# Patient Record
Sex: MALE | Race: WHITE | NOT HISPANIC OR LATINO | Employment: FULL TIME | URBAN - METROPOLITAN AREA
[De-identification: names, ages, dates, MRNs, and addresses within clinical notes are randomized per-mention and may not be internally consistent; named-entity substitution may affect disease eponyms.]

---

## 2018-10-28 ENCOUNTER — HOSPITAL ENCOUNTER (EMERGENCY)
Facility: HOSPITAL | Age: 28
Discharge: HOME | End: 2018-10-28
Attending: EMERGENCY MEDICINE
Payer: COMMERCIAL

## 2018-10-28 VITALS
RESPIRATION RATE: 17 BRPM | HEIGHT: 70 IN | SYSTOLIC BLOOD PRESSURE: 145 MMHG | WEIGHT: 185 LBS | BODY MASS INDEX: 26.48 KG/M2 | DIASTOLIC BLOOD PRESSURE: 74 MMHG | TEMPERATURE: 99 F | HEART RATE: 103 BPM

## 2018-10-28 DIAGNOSIS — S93.402A SPRAIN OF LEFT ANKLE, UNSPECIFIED LIGAMENT, INITIAL ENCOUNTER: Primary | ICD-10-CM

## 2018-10-28 DIAGNOSIS — R52 PAIN: ICD-10-CM

## 2018-10-28 PROCEDURE — 99284 EMERGENCY DEPT VISIT MOD MDM: CPT | Mod: 25

## 2018-10-28 PROCEDURE — 96372 THER/PROPH/DIAG INJ SC/IM: CPT

## 2018-10-28 PROCEDURE — 63600175 PHARM REV CODE 636 W HCPCS: Performed by: PHYSICIAN ASSISTANT

## 2018-10-28 PROCEDURE — 29515 APPLICATION SHORT LEG SPLINT: CPT

## 2018-10-28 RX ORDER — KETOROLAC TROMETHAMINE 30 MG/ML
30 INJECTION, SOLUTION INTRAMUSCULAR; INTRAVENOUS
Status: COMPLETED | OUTPATIENT
Start: 2018-10-28 | End: 2018-10-28

## 2018-10-28 RX ADMIN — KETOROLAC TROMETHAMINE 30 MG: 30 INJECTION, SOLUTION INTRAMUSCULAR at 07:10

## 2018-10-29 NOTE — ED NOTES
Patient identifiers for Coy Emmanuel Jr. checked and correct.  LOC:  Patient is awake, alert, and aware of environment with an appropriate affect. Patient is oriented x 3 and speaking appropriately.  APPEARANCE:  Patient resting comfortably and in no acute distress. Patient is clean and well groomed, patient's clothing is properly fastened.  SKIN:  The skin is warm and dry. Patient has normal skin turgor and moist mucus membranes. Skin is intact; no bruising or breakdown noted.  MUSCULOSKELETAL:  Patient is moving all extremities well, no obvious deformities noted. Pulses intact.  Lt ankle pain and mod swelling.  Pt twisted ankle last night.  Ambulates with a limp.  Pedal pulses equal, present and strong   RESPIRATORY:  Airway is open and patent. Respirations are spontaneous and non-labored with normal effort and rate.  CARDIAC:  Patient has a normal rate and rhythm. No peripheral edema noted. Capillary refill < 3 seconds.  ABDOMEN:  No distention noted.  Soft and non-tender upon palpation.  NEUROLOGICAL:  PERRL. Facial expression is symmetrical. Hand grasps are equal bilaterally. Normal sensation in all extremities when touched with finger.  Allergies reported:  Review of patient's allergies indicates:  No Known Allergies  OTHER NOTES:

## 2018-10-29 NOTE — ED PROVIDER NOTES
Encounter Date: 10/28/2018    SCRIBE #1 NOTE: IMarilyn, am scribing for, and in the presence of, AMBER Ha.       History     Chief Complaint   Patient presents with    Ankle Pain     Twisted ankle last night.  Pain and swelling noted        Time seen by provider: 7:33 PM on 10/28/2018    Coy Emmanuel Jr. is a 28 y.o. male with no pertinent PMHx or SHx on file who presents to the ED accompanied by his mother with complaints of left ankle pain associated with swelling that started last night. Patient states he was stepping off of a step ladder when his leg gave out causing him to twist his left ankle inadvertently. He endorses applying ice to his ankle and wrapping it while elevating his foot with no improvements to pain or swelling. He also reports taking Aleve with little to no improvements to pain. Patient endorses being able to bear weight on his foot but still has pain. He denies having any chronic health conditions. He denies onset of any other new symptoms. He has no other medical concerns or complaints at this moment. NKDA noted.       The history is provided by the patient.     Review of patient's allergies indicates:  No Known Allergies  History reviewed. No pertinent past medical history.  History reviewed. No pertinent surgical history.  History reviewed. No pertinent family history.  Social History     Tobacco Use    Smoking status: Current Every Day Smoker     Packs/day: 0.50    Smokeless tobacco: Never Used   Substance Use Topics    Alcohol use: Yes    Drug use: No     Review of Systems   Constitutional: Negative for fever.   HENT: Negative for facial swelling.    Respiratory: Negative for shortness of breath.    Cardiovascular: Negative for leg swelling.   Gastrointestinal: Negative for nausea and vomiting.   Musculoskeletal: Positive for arthralgias (left ankle) and joint swelling (left ankle). Negative for gait problem and neck pain.   Skin: Negative for color change,  rash and wound.   Neurological: Negative for weakness and numbness.   Hematological: Does not bruise/bleed easily.   Psychiatric/Behavioral: Negative for confusion.       Physical Exam     Initial Vitals [10/28/18 1905]   BP Pulse Resp Temp SpO2   (!) 145/74 103 17 98.6 °F (37 °C) --      MAP       --         Physical Exam    Nursing note and vitals reviewed.  Constitutional: He appears well-developed and well-nourished. He is not diaphoretic. No distress.   Cardiovascular: Normal rate, regular rhythm, normal heart sounds and intact distal pulses. Exam reveals no gallop and no friction rub.    No murmur heard.  Pulses:       Dorsalis pedis pulses are 2+ on the right side, and 2+ on the left side.        Posterior tibial pulses are 2+ on the right side, and 2+ on the left side.   Pulmonary/Chest: Breath sounds normal. No respiratory distress. He has no wheezes. He has no rhonchi. He has no rales.   Musculoskeletal: Normal range of motion. He exhibits tenderness. He exhibits no edema.        Left ankle: He exhibits swelling. He exhibits normal range of motion, no deformity and normal pulse. Tenderness.   Swelling noted to the left ankle. No obvious deformity. TTP with no bruising to the left ankle. Capillary refills less than 2 seconds. Normal DP/PT pulses. Pt able to bear weight.    Neurological: He is alert and oriented to person, place, and time. He has normal strength. No sensory deficit.   Skin: Skin is warm and dry. Capillary refill takes less than 2 seconds. No bruising, no rash and no abscess noted. No erythema.   Psychiatric: He has a normal mood and affect.         ED Course   Procedures  Labs Reviewed - No data to display       Imaging Results          X-Ray Ankle Complete Left (In process)                  Medical Decision Making:   Differential Diagnosis:   Fracture  Dislocation  Sprain  Contusion  Strain  Spasm    Clinical Tests:   Radiological Study: Ordered and Reviewed       APC / Resident Notes:    Xray negative for acute abnormalities.  Pt given toradol injection and placed in ankle stirrup in ED. Pt instructed on RICE care.  Instructed pt to f/u with ortho if no improvement. Pt voices understanding and is agreeable to the plan.  He is given specific return precautions.        Scribe Attestation:   Scribe #1: I performed the above scribed service and the documentation accurately describes the services I performed. I attest to the accuracy of the note.      I, AMBER Ha, personally performed the services described in this documentation. All medical record entries made by the scribe were at my direction and in my presence.  I have reviewed the chart and agree that the record reflects my personal performance and is accurate and complete. AMBER Ha.  9:13 PM 10/28/2018           Clinical Impression:   The primary encounter diagnosis was Sprain of left ankle, unspecified ligament, initial encounter. A diagnosis of Pain was also pertinent to this visit.                             Danielle Quinones NP  10/28/18 [id number]

## 2021-07-01 ENCOUNTER — PATIENT MESSAGE (OUTPATIENT)
Dept: ADMINISTRATIVE | Facility: OTHER | Age: 31
End: 2021-07-01

## 2021-08-03 ENCOUNTER — OFFICE VISIT (OUTPATIENT)
Dept: FAMILY MEDICINE | Facility: CLINIC | Age: 31
End: 2021-08-03
Payer: COMMERCIAL

## 2021-08-03 VITALS
WEIGHT: 199 LBS | DIASTOLIC BLOOD PRESSURE: 90 MMHG | BODY MASS INDEX: 28.49 KG/M2 | SYSTOLIC BLOOD PRESSURE: 146 MMHG | HEART RATE: 76 BPM | HEIGHT: 70 IN

## 2021-08-03 DIAGNOSIS — Z02.5 ROUTINE SPORTS PHYSICAL EXAM: Primary | ICD-10-CM

## 2021-08-03 DIAGNOSIS — I10 ESSENTIAL HYPERTENSION: ICD-10-CM

## 2021-08-03 PROCEDURE — 1160F RVW MEDS BY RX/DR IN RCRD: CPT | Mod: S$GLB,,, | Performed by: PHYSICIAN ASSISTANT

## 2021-08-03 PROCEDURE — 3077F SYST BP >= 140 MM HG: CPT | Mod: S$GLB,,, | Performed by: PHYSICIAN ASSISTANT

## 2021-08-03 PROCEDURE — 3077F PR MOST RECENT SYSTOLIC BLOOD PRESSURE >= 140 MM HG: ICD-10-PCS | Mod: S$GLB,,, | Performed by: PHYSICIAN ASSISTANT

## 2021-08-03 PROCEDURE — 1160F PR REVIEW ALL MEDS BY PRESCRIBER/CLIN PHARMACIST DOCUMENTED: ICD-10-PCS | Mod: S$GLB,,, | Performed by: PHYSICIAN ASSISTANT

## 2021-08-03 PROCEDURE — 3008F PR BODY MASS INDEX (BMI) DOCUMENTED: ICD-10-PCS | Mod: S$GLB,,, | Performed by: PHYSICIAN ASSISTANT

## 2021-08-03 PROCEDURE — 3008F BODY MASS INDEX DOCD: CPT | Mod: S$GLB,,, | Performed by: PHYSICIAN ASSISTANT

## 2021-08-03 PROCEDURE — 99203 OFFICE O/P NEW LOW 30 MIN: CPT | Mod: S$GLB,,, | Performed by: PHYSICIAN ASSISTANT

## 2021-08-03 PROCEDURE — 3080F DIAST BP >= 90 MM HG: CPT | Mod: S$GLB,,, | Performed by: PHYSICIAN ASSISTANT

## 2021-08-03 PROCEDURE — 3080F PR MOST RECENT DIASTOLIC BLOOD PRESSURE >= 90 MM HG: ICD-10-PCS | Mod: S$GLB,,, | Performed by: PHYSICIAN ASSISTANT

## 2021-08-03 PROCEDURE — 99203 PR OFFICE/OUTPT VISIT, NEW, LEVL III, 30-44 MIN: ICD-10-PCS | Mod: S$GLB,,, | Performed by: PHYSICIAN ASSISTANT

## 2021-08-03 RX ORDER — LISINOPRIL 10 MG/1
10 TABLET ORAL DAILY
Qty: 90 TABLET | Refills: 3 | Status: SHIPPED | OUTPATIENT
Start: 2021-08-03 | End: 2022-02-08 | Stop reason: SDUPTHER

## 2021-08-05 ENCOUNTER — TELEPHONE (OUTPATIENT)
Dept: FAMILY MEDICINE | Facility: CLINIC | Age: 31
End: 2021-08-05

## 2021-08-05 LAB
ALBUMIN SERPL-MCNC: 5.2 G/DL (ref 3.6–5.1)
ALBUMIN/CREAT UR: 3 MCG/MG CREAT
ALBUMIN/GLOB SERPL: 2.2 (CALC) (ref 1–2.5)
ALP SERPL-CCNC: 79 U/L (ref 36–130)
ALT SERPL-CCNC: 65 U/L (ref 9–46)
APPEARANCE UR: CLEAR
AST SERPL-CCNC: 29 U/L (ref 10–40)
BACTERIA #/AREA URNS HPF: NORMAL /HPF
BACTERIA UR CULT: NORMAL
BASOPHILS # BLD AUTO: 61 CELLS/UL (ref 0–200)
BASOPHILS NFR BLD AUTO: 1 %
BILIRUB SERPL-MCNC: 0.8 MG/DL (ref 0.2–1.2)
BILIRUB UR QL STRIP: NEGATIVE
BUN SERPL-MCNC: 12 MG/DL (ref 7–25)
BUN/CREAT SERPL: ABNORMAL (CALC) (ref 6–22)
CALCIUM SERPL-MCNC: 10.1 MG/DL (ref 8.6–10.3)
CHLORIDE SERPL-SCNC: 102 MMOL/L (ref 98–110)
CHOLEST SERPL-MCNC: 205 MG/DL
CHOLEST/HDLC SERPL: 5.4 (CALC)
CO2 SERPL-SCNC: 31 MMOL/L (ref 20–32)
COLOR UR: YELLOW
CREAT SERPL-MCNC: 1.13 MG/DL (ref 0.6–1.35)
CREAT UR-MCNC: 260 MG/DL (ref 20–320)
EOSINOPHIL # BLD AUTO: 470 CELLS/UL (ref 15–500)
EOSINOPHIL NFR BLD AUTO: 7.7 %
ERYTHROCYTE [DISTWIDTH] IN BLOOD BY AUTOMATED COUNT: 12.5 % (ref 11–15)
GLOBULIN SER CALC-MCNC: 2.4 G/DL (CALC) (ref 1.9–3.7)
GLUCOSE SERPL-MCNC: 85 MG/DL (ref 65–99)
GLUCOSE UR QL STRIP: NEGATIVE
HCT VFR BLD AUTO: 47.1 % (ref 38.5–50)
HCV AB S/CO SERPL IA: 0.01
HCV AB SERPL QL IA: NORMAL
HDLC SERPL-MCNC: 38 MG/DL
HGB BLD-MCNC: 16.4 G/DL (ref 13.2–17.1)
HGB UR QL STRIP: NEGATIVE
HIV 1+2 AB+HIV1 P24 AG SERPL QL IA: NORMAL
HYALINE CASTS #/AREA URNS LPF: NORMAL /LPF
KETONES UR QL STRIP: NEGATIVE
LDLC SERPL CALC-MCNC: 124 MG/DL (CALC)
LEUKOCYTE ESTERASE UR QL STRIP: NEGATIVE
LYMPHOCYTES # BLD AUTO: 1147 CELLS/UL (ref 850–3900)
LYMPHOCYTES NFR BLD AUTO: 18.8 %
MCH RBC QN AUTO: 31.8 PG (ref 27–33)
MCHC RBC AUTO-ENTMCNC: 34.8 G/DL (ref 32–36)
MCV RBC AUTO: 91.5 FL (ref 80–100)
MICROALBUMIN UR-MCNC: 0.9 MG/DL
MONOCYTES # BLD AUTO: 403 CELLS/UL (ref 200–950)
MONOCYTES NFR BLD AUTO: 6.6 %
NEUTROPHILS # BLD AUTO: 4020 CELLS/UL (ref 1500–7800)
NEUTROPHILS NFR BLD AUTO: 65.9 %
NITRITE UR QL STRIP: NEGATIVE
NONHDLC SERPL-MCNC: 167 MG/DL (CALC)
PH UR STRIP: 6 [PH] (ref 5–8)
PLATELET # BLD AUTO: 251 THOUSAND/UL (ref 140–400)
PMV BLD REES-ECKER: 10.4 FL (ref 7.5–12.5)
POTASSIUM SERPL-SCNC: 4 MMOL/L (ref 3.5–5.3)
PROT SERPL-MCNC: 7.6 G/DL (ref 6.1–8.1)
PROT UR QL STRIP: NEGATIVE
RBC # BLD AUTO: 5.15 MILLION/UL (ref 4.2–5.8)
RBC #/AREA URNS HPF: NORMAL /HPF
SODIUM SERPL-SCNC: 140 MMOL/L (ref 135–146)
SP GR UR STRIP: 1.03 (ref 1–1.03)
SQUAMOUS #/AREA URNS HPF: NORMAL /HPF
TRIGL SERPL-MCNC: 300 MG/DL
TSH SERPL-ACNC: 3.3 MIU/L (ref 0.4–4.5)
WBC # BLD AUTO: 6.1 THOUSAND/UL (ref 3.8–10.8)
WBC #/AREA URNS HPF: NORMAL /HPF

## 2021-08-23 ENCOUNTER — HOSPITAL ENCOUNTER (OUTPATIENT)
Dept: RADIOLOGY | Facility: HOSPITAL | Age: 31
Discharge: HOME | End: 2021-08-23
Attending: PHYSICIAN ASSISTANT
Payer: COMMERCIAL

## 2021-08-23 ENCOUNTER — TELEPHONE (OUTPATIENT)
Dept: FAMILY MEDICINE | Facility: CLINIC | Age: 31
End: 2021-08-23

## 2021-08-23 DIAGNOSIS — S62.609A FINGER FRACTURE: Primary | ICD-10-CM

## 2021-08-23 DIAGNOSIS — M79.641 HAND PAIN, RIGHT: Primary | ICD-10-CM

## 2021-08-23 DIAGNOSIS — M79.641 HAND PAIN, RIGHT: ICD-10-CM

## 2021-08-23 PROCEDURE — 73130 X-RAY EXAM OF HAND: CPT | Mod: TC,PO,RT

## 2021-08-24 ENCOUNTER — TELEPHONE (OUTPATIENT)
Dept: FAMILY MEDICINE | Facility: CLINIC | Age: 31
End: 2021-08-24

## 2021-09-14 ENCOUNTER — PATIENT MESSAGE (OUTPATIENT)
Dept: FAMILY MEDICINE | Facility: CLINIC | Age: 31
End: 2021-09-14

## 2021-12-06 ENCOUNTER — PATIENT MESSAGE (OUTPATIENT)
Dept: FAMILY MEDICINE | Facility: CLINIC | Age: 31
End: 2021-12-06

## 2022-02-08 ENCOUNTER — OFFICE VISIT (OUTPATIENT)
Dept: FAMILY MEDICINE | Facility: CLINIC | Age: 32
End: 2022-02-08
Payer: COMMERCIAL

## 2022-02-08 VITALS
OXYGEN SATURATION: 98 % | SYSTOLIC BLOOD PRESSURE: 128 MMHG | BODY MASS INDEX: 28.92 KG/M2 | WEIGHT: 202 LBS | DIASTOLIC BLOOD PRESSURE: 82 MMHG | HEART RATE: 76 BPM | HEIGHT: 70 IN

## 2022-02-08 DIAGNOSIS — I10 ESSENTIAL HYPERTENSION: ICD-10-CM

## 2022-02-08 DIAGNOSIS — Z02.5 ROUTINE SPORTS PHYSICAL EXAM: Primary | ICD-10-CM

## 2022-02-08 DIAGNOSIS — Z23 FLU VACCINE NEED: ICD-10-CM

## 2022-02-08 PROCEDURE — 90471 IMMUNIZATION ADMIN: CPT | Mod: S$GLB,,, | Performed by: PHYSICIAN ASSISTANT

## 2022-02-08 PROCEDURE — 3074F PR MOST RECENT SYSTOLIC BLOOD PRESSURE < 130 MM HG: ICD-10-PCS | Mod: S$GLB,,, | Performed by: PHYSICIAN ASSISTANT

## 2022-02-08 PROCEDURE — 99395 PR PREVENTIVE VISIT,EST,18-39: ICD-10-PCS | Mod: 25,S$GLB,, | Performed by: PHYSICIAN ASSISTANT

## 2022-02-08 PROCEDURE — 90682 FLU VACCINE - QUADRIVALENT (RECOMBINANT) PRESERVATIVE FREE: ICD-10-PCS | Mod: S$GLB,,, | Performed by: PHYSICIAN ASSISTANT

## 2022-02-08 PROCEDURE — 90682 RIV4 VACC RECOMBINANT DNA IM: CPT | Mod: S$GLB,,, | Performed by: PHYSICIAN ASSISTANT

## 2022-02-08 PROCEDURE — 3008F BODY MASS INDEX DOCD: CPT | Mod: S$GLB,,, | Performed by: PHYSICIAN ASSISTANT

## 2022-02-08 PROCEDURE — 3074F SYST BP LT 130 MM HG: CPT | Mod: S$GLB,,, | Performed by: PHYSICIAN ASSISTANT

## 2022-02-08 PROCEDURE — 3079F DIAST BP 80-89 MM HG: CPT | Mod: S$GLB,,, | Performed by: PHYSICIAN ASSISTANT

## 2022-02-08 PROCEDURE — 3079F PR MOST RECENT DIASTOLIC BLOOD PRESSURE 80-89 MM HG: ICD-10-PCS | Mod: S$GLB,,, | Performed by: PHYSICIAN ASSISTANT

## 2022-02-08 PROCEDURE — 3008F PR BODY MASS INDEX (BMI) DOCUMENTED: ICD-10-PCS | Mod: S$GLB,,, | Performed by: PHYSICIAN ASSISTANT

## 2022-02-08 PROCEDURE — 99395 PREV VISIT EST AGE 18-39: CPT | Mod: 25,S$GLB,, | Performed by: PHYSICIAN ASSISTANT

## 2022-02-08 PROCEDURE — 90471 FLU VACCINE - QUADRIVALENT (RECOMBINANT) PRESERVATIVE FREE: ICD-10-PCS | Mod: S$GLB,,, | Performed by: PHYSICIAN ASSISTANT

## 2022-02-08 RX ORDER — LISINOPRIL 10 MG/1
10 TABLET ORAL DAILY
Qty: 90 TABLET | Refills: 3 | Status: SHIPPED | OUTPATIENT
Start: 2022-02-08 | End: 2023-01-19 | Stop reason: SDUPTHER

## 2022-02-08 NOTE — PROGRESS NOTES
SUBJECTIVE:    Patient ID: Coy Emmanuel Jr. is a 31 y.o. male.    Chief Complaint: Follow-up (6 mo f/u for hypertension//no med bottles//flu vac ordered//tc)    30 yo wm presents for regular checkup and refills. Reports that he has been doing well. Had RT pinky finger fracture that actually healed well without requiring surgery. Reports that his pressure has been looking great. Compliant with lisinopril as prescribed. Pressure readings at home at goal as well. Tolerating just fine. Stays active.      No visits with results within 6 Month(s) from this visit.   Latest known visit with results is:   Office Visit on 08/03/2021   Component Date Value Ref Range Status    WBC 08/03/2021 6.1  3.8 - 10.8 Thousand/uL Final    RBC 08/03/2021 5.15  4.20 - 5.80 Million/uL Final    Hemoglobin 08/03/2021 16.4  13.2 - 17.1 g/dL Final    Hematocrit 08/03/2021 47.1  38.5 - 50.0 % Final    MCV 08/03/2021 91.5  80.0 - 100.0 fL Final    MCH 08/03/2021 31.8  27.0 - 33.0 pg Final    MCHC 08/03/2021 34.8  32.0 - 36.0 g/dL Final    RDW 08/03/2021 12.5  11.0 - 15.0 % Final    Platelets 08/03/2021 251  140 - 400 Thousand/uL Final    MPV 08/03/2021 10.4  7.5 - 12.5 fL Final    Neutrophils, Abs 08/03/2021 4,020  1,500 - 7,800 cells/uL Final    Lymph # 08/03/2021 1,147  850 - 3,900 cells/uL Final    Mono # 08/03/2021 403  200 - 950 cells/uL Final    Eos # 08/03/2021 470  15 - 500 cells/uL Final    Baso # 08/03/2021 61  0 - 200 cells/uL Final    Neutrophils Relative 08/03/2021 65.9  % Final    Lymph % 08/03/2021 18.8  % Final    Mono % 08/03/2021 6.6  % Final    Eosinophil % 08/03/2021 7.7  % Final    Basophil % 08/03/2021 1.0  % Final    Glucose 08/03/2021 85  65 - 99 mg/dL Final    BUN 08/03/2021 12  7 - 25 mg/dL Final    Creatinine 08/03/2021 1.13  0.60 - 1.35 mg/dL Final    eGFR if non African American 08/03/2021 87  > OR = 60 mL/min/1.73m2 Final    eGFR if African American 08/03/2021 101  > OR = 60  mL/min/1.73m2 Final    BUN/Creatinine Ratio 08/03/2021 NOT APPLICABLE  6 - 22 (calc) Final    Sodium 08/03/2021 140  135 - 146 mmol/L Final    Potassium 08/03/2021 4.0  3.5 - 5.3 mmol/L Final    Chloride 08/03/2021 102  98 - 110 mmol/L Final    CO2 08/03/2021 31  20 - 32 mmol/L Final    Calcium 08/03/2021 10.1  8.6 - 10.3 mg/dL Final    Total Protein 08/03/2021 7.6  6.1 - 8.1 g/dL Final    Albumin 08/03/2021 5.2* 3.6 - 5.1 g/dL Final    Globulin, Total 08/03/2021 2.4  1.9 - 3.7 g/dL (calc) Final    Albumin/Globulin Ratio 08/03/2021 2.2  1.0 - 2.5 (calc) Final    Total Bilirubin 08/03/2021 0.8  0.2 - 1.2 mg/dL Final    Alkaline Phosphatase 08/03/2021 79  36 - 130 U/L Final    AST 08/03/2021 29  10 - 40 U/L Final    ALT 08/03/2021 65* 9 - 46 U/L Final    Cholesterol 08/03/2021 205* <200 mg/dL Final    HDL 08/03/2021 38* > OR = 40 mg/dL Final    Triglycerides 08/03/2021 300* <150 mg/dL Final    LDL Cholesterol 08/03/2021 124* mg/dL (calc) Final    HDL/Cholesterol Ratio 08/03/2021 5.4* <5.0 (calc) Final    Non HDL Chol. (LDL+VLDL) 08/03/2021 167* <130 mg/dL (calc) Final    TSH w/reflex to FT4 08/03/2021 3.30  0.40 - 4.50 mIU/L Final    Color, UA 08/03/2021 YELLOW  YELLOW Final    Appearance, UA 08/03/2021 CLEAR  CLEAR Final    Specific [place], UA 08/03/2021 1.026  1.001 - 1.035 Final    pH, UA 08/03/2021 6.0  5.0 - 8.0 Final    Glucose, UA 08/03/2021 NEGATIVE  NEGATIVE Final    Bilirubin, UA 08/03/2021 NEGATIVE  NEGATIVE Final    Ketones, UA 08/03/2021 NEGATIVE  NEGATIVE Final    Occult Blood UA 08/03/2021 NEGATIVE  NEGATIVE Final    Protein, UA 08/03/2021 NEGATIVE  NEGATIVE Final    Nitrite, UA 08/03/2021 NEGATIVE  NEGATIVE Final    Leukocytes, UA 08/03/2021 NEGATIVE  NEGATIVE Final    WBC Casts, UA 08/03/2021 NONE SEEN  < OR = 5 /HPF Final    RBC Casts, UA 08/03/2021 NONE SEEN  < OR = 2 /HPF Final    Squam Epithel, UA 08/03/2021 NONE SEEN  < OR = 5 /HPF Final    Bacteria, UA  08/03/2021 NONE SEEN  NONE SEEN /HPF Final    Hyaline Casts, UA 08/03/2021 NONE SEEN  NONE SEEN /LPF Final    Reflexive Urine Culture 08/03/2021    Final    Creatinine, Urine 08/03/2021 260  20 - 320 mg/dL Final    Microalb, Ur 08/03/2021 0.9  See Note: mg/dL Final    Microalb/Creat Ratio 08/03/2021 3  <30 mcg/mg creat Final    Hepatitis C Ab 08/03/2021 NON-REACTIVE  NON-REACTIVE Final    Signal/Cutoff 08/03/2021 0.01  <1.00 Final    HIV Ag/Ab 4th Gen 08/03/2021 NON-REACTIVE  NON-REACTIVE Final       Past Medical History:   Diagnosis Date    Hypertension      No past surgical history on file.  Family History   Problem Relation Age of Onset    Heart disease Paternal Uncle     Diabetes Maternal Grandmother     Gout Father     Alzheimer's disease Paternal Grandfather        Marital Status: Single  Alcohol History:  reports current alcohol use of about 5.0 standard drinks of alcohol per week.  Tobacco History:  reports that he has been smoking cigarettes. He has been smoking about 0.50 packs per day. He has never used smokeless tobacco.  Drug History:  reports no history of drug use.    Review of patient's allergies indicates:  No Known Allergies    Current Outpatient Medications:     lisinopriL 10 MG tablet, Take 1 tablet (10 mg total) by mouth once daily., Disp: 90 tablet, Rfl: 3    Review of Systems   Constitutional: Negative for activity change, fatigue, fever and unexpected weight change.   HENT: Negative for congestion.    Respiratory: Negative for apnea, cough, chest tightness and shortness of breath.    Cardiovascular: Negative for chest pain and palpitations.   Gastrointestinal: Negative for abdominal distention and abdominal pain.   Genitourinary: Negative for difficulty urinating and dysuria.   Musculoskeletal: Negative for arthralgias and back pain.   Neurological: Negative for dizziness and weakness.          Objective:      Vitals:    02/08/22 0923   BP: 128/82   Pulse: 76   SpO2: 98%  "  Weight: 91.6 kg (202 lb)   Height: 5' 10" (1.778 m)     Physical Exam  Constitutional:       General: He is not in acute distress.     Appearance: He is well-developed and well-nourished.   HENT:      Head: Normocephalic and atraumatic.   Eyes:      Pupils: Pupils are equal, round, and reactive to light.   Neck:      Thyroid: No thyromegaly.   Cardiovascular:      Rate and Rhythm: Normal rate and regular rhythm.      Pulses: Intact distal pulses.      Heart sounds: Normal heart sounds.   Pulmonary:      Effort: Pulmonary effort is normal.      Breath sounds: Normal breath sounds.   Abdominal:      General: Bowel sounds are normal. There is no distension.      Palpations: Abdomen is soft.      Tenderness: There is no abdominal tenderness.   Musculoskeletal:         General: Normal range of motion.      Cervical back: Normal range of motion and neck supple.   Skin:     General: Skin is warm and dry.      Findings: No erythema or rash.   Neurological:      Mental Status: He is alert and oriented to person, place, and time.      Cranial Nerves: No cranial nerve deficit.           Assessment:       1. Routine sports physical exam    2. Flu vaccine need    3. Essential hypertension         Plan:       Routine sports physical exam  Comments:  very healthy individual. Labs reviewed for ongoing pt care.   Orders:  -     lisinopriL 10 MG tablet; Take 1 tablet (10 mg total) by mouth once daily.  Dispense: 90 tablet; Refill: 3    Flu vaccine need  -     Influenza - Quadrivalent (Recombinant) (PF)    Essential hypertension  Comments:  Pressure remains at goal. continue as is. refills sent for the year      Follow up in about 1 year (around 2/8/2023) for Annual Physical.        2/8/2022 Adiel Veloz PA-C      "

## 2022-02-08 NOTE — PATIENT INSTRUCTIONS
Patient Education       Heart Healthy Diet   General   With a heart healthy food plan, you will learn to make better food choices. This diet may help you lower your blood cholesterol level, manage your blood pressure, and lower your risk for heart problems. Smaller portions may also be helpful.  Sodium is a type of mineral found in many foods. It helps keep the balance of fluids in your body. Too much sodium can raise your blood pressure. It can also make you take on extra water. This is called edema. Pay careful attention to how much salt or sodium is in your food. You may need to avoid salt or eat foods with less sodium.  Cholesterol is a fat-like, waxy substance in your blood. It is normal to have some cholesterol in your blood because your body makes it. You also get extra cholesterol from all animal products. These are foods like meats, eggs, and dairy products. Too much cholesterol in your blood can block or damage your blood vessels. This can lead to a heart attack or stroke.  Fats in your food have calories which give energy. Not all fats are bad. Some fats are healthy, like the fat found in fish, nuts, and olive oil. These are called unsaturated fats. They help manage body functions and lower cholesterol levels. Learn about the best fats to use in your diet and where to use them. Eating too much fat may make you more likely to weigh more than is healthy. This raises your risk of many heart problems.  Fiber is found in plants. Meat and dairy products do not have fiber in them. Fiber can help you lower your unhealthy cholesterol level. You may need more water as you eat more fiber so you do not get hard stools.  What lifestyle changes are needed?   Eat a healthy diet and workout often. Try to use as many calories as you take in each day.  What changes to diet are needed?   · Eat oily fish at least 2 times a week. These are fish like tuna, salmon, and mackerel.  · Limit sodium to no more than 2,300 mg of  sodium per day. This is about 1 teaspoon (5 grams) of table salt. Use little or no salt when making food. Try other spices or seasoning instead.  · Limit how much cholesterol you eat to less than 300 mg per day. You can do this by having lean meats. Also eat lots of fruits, vegetables, and fat-free and low-fat dairy products.  · Limit how much trans fats you eat. Trans fats are found in many processed foods like stick margarine, shortening, and some fried foods. Also, lower how much hydrogenated fats you eat. They are used to make pastries, biscuits, cookies, crackers, chips, and many snack foods.  · Have no more than 1 drink per day of beer, wine, and mixed drinks (alcohol).  Who should use this diet?   A heart healthy diet is good for everyone.  What foods are good to eat?   · Grains: Try to eat 6 to 8 servings of whole grain, high fiber foods each day. These are whole grain bread, cereals, brown rice, or pasta.  · Fruits and vegetables: Eat 4 to 5 servings each day. Try to pick many kinds and colors. Try to eat more that are fresh or frozen. Look for low sodium or salt-free if you choose canned. Rinse canned items before cooking or eating. Dried peas, beans, and lentils are also good.  · Dairy: Choose low fat (1%) or fat-free milk. Eat nonfat or low-fat products.  · Protein: Try to eat more low fat or lean meats like chicken and turkey. Eat less red meat and eat more fish, eggs, egg whites, and beans instead.  · Fats: Use good fats found in fish, nuts, and avocados. Try using olive oil, canola oil, and low-sodium and low-fat salad dressing and mayonnaise. Use corn, safflower, sunflower, and soybean oils.  · Condiments: Use low-sodium or salt-free broths, soups, soy sauce, and condiments. Pepper, herbs, spices, vinegar, lemon or lime juices are great for seasoning. Sugar, cocoa powder, honey, syrup, and jams may be eaten in small amounts.  · Sweets: Low-fat, trans fat-free cookies, cakes, and pies; davida  crackers; animal crackers; low-fat fig bars; and bert snaps.     What foods should be limited or avoided?   · Grains: Salted breads, rolls, crackers, quick breads, self-rising flours, biscuit mixes, regular bread crumbs, instant hot cereals, commercially-prepared rice, pasta, stuffing mixes  · Fruits and vegetables: Commercially-prepared potatoes and vegetable mixes, regular canned vegetables and juices, vegetables frozen with sauce or pickled vegetables, processed fruits with added sugar or salt  · Dairy: Whole milk, malted milk, chocolate milk, buttermilk  · Protein: Smoked, cured, salted, or canned meat, fish, or poultry such as mancilla and sausages  · Fats: Cut back on solid fats like butter, lard, and margarine.  · Condiments and snacks: Salted and canned peas, beans, and olives; salted snack foods; fried foods; soda, juices, or other sweetened drinks; commercially-softened water. Miso, salsa, ketchup, barbecue sauce, Worcestershire sauce, soy sauce, and teriyaki sauce are also high in salt.  · Sweets: High-fat baked goods such as muffins, donuts, pastries, commercial baked goods  Helpful tips   · When you go to a grocery store, have a list or a meal plan. Do not shop when you are hungry to avoid cravings for foods.  · You need to know about the sodium and fat content of the food you eat. Read food labels with care. They will show you how much of each is in a serving. This amount is given as a percentage of the total amount you need each day. Reading the labels will help you make healthy food choices.     · Avoid fast foods.  · Watch your portions when eating out. Split an order or bring home half for another meal.     · Talk to a dietitian for help.  Where can I learn more?   American Academy of [employer] Physicians  https://familydoctor.org/diet-and-exercise-for-a-healthy-heart/   American Heart  Association  https://www.heart.org/en/healthy-living/healthy-eating/eat-smart/nutrition-basics/aha-diet-and-lifestyle-recommendations   NHS  https://www.nhs.uk/live-well/eat-well/   Last Reviewed Date   2020-03-27  Consumer Information Use and Disclaimer   This information is not specific medical advice and does not replace information you receive from your health care provider. This is only a brief summary of general information. It does NOT include all information about conditions, illnesses, injuries, tests, procedures, treatments, therapies, discharge instructions or life-style choices that may apply to you. You must talk with your health care provider for complete information about your health and treatment options. This information should not be used to decide whether or not to accept your health care providers advice, instructions or recommendations. Only your health care provider has the knowledge and training to provide advice that is right for you.  Copyright   Copyright © 2021 [employer] and its affiliates and/or licensors. All rights reserved.

## 2023-01-17 ENCOUNTER — PATIENT MESSAGE (OUTPATIENT)
Dept: FAMILY MEDICINE | Facility: CLINIC | Age: 33
End: 2023-01-17

## 2023-01-17 DIAGNOSIS — Z02.5 ROUTINE SPORTS PHYSICAL EXAM: ICD-10-CM

## 2023-01-17 RX ORDER — LISINOPRIL 10 MG/1
10 TABLET ORAL DAILY
Qty: 90 TABLET | Refills: 3 | Status: CANCELLED | OUTPATIENT
Start: 2023-01-17 | End: 2024-01-17

## 2023-01-18 ENCOUNTER — PATIENT MESSAGE (OUTPATIENT)
Dept: FAMILY MEDICINE | Facility: CLINIC | Age: 33
End: 2023-01-18

## 2023-01-18 DIAGNOSIS — Z02.5 ROUTINE SPORTS PHYSICAL EXAM: ICD-10-CM

## 2023-01-19 ENCOUNTER — PATIENT MESSAGE (OUTPATIENT)
Dept: FAMILY MEDICINE | Facility: CLINIC | Age: 33
End: 2023-01-19

## 2023-01-19 RX ORDER — LISINOPRIL 10 MG/1
10 TABLET ORAL DAILY
Qty: 90 TABLET | Refills: 3 | Status: SHIPPED | OUTPATIENT
Start: 2023-01-19 | End: 2023-06-29 | Stop reason: SDUPTHER

## 2023-06-29 ENCOUNTER — OFFICE VISIT (OUTPATIENT)
Dept: PRIMARY CARE CLINIC | Facility: CLINIC | Age: 33
End: 2023-06-29
Payer: COMMERCIAL

## 2023-06-29 VITALS
TEMPERATURE: 98 F | SYSTOLIC BLOOD PRESSURE: 120 MMHG | WEIGHT: 201.75 LBS | DIASTOLIC BLOOD PRESSURE: 74 MMHG | HEART RATE: 88 BPM | BODY MASS INDEX: 28.88 KG/M2 | RESPIRATION RATE: 16 BRPM | OXYGEN SATURATION: 96 % | HEIGHT: 70 IN

## 2023-06-29 DIAGNOSIS — E74.819 DISORDERS OF GLUCOSE TRANSPORT, UNSPECIFIED: ICD-10-CM

## 2023-06-29 DIAGNOSIS — T46.4X5A ACE-INHIBITOR COUGH: ICD-10-CM

## 2023-06-29 DIAGNOSIS — R05.8 ACE-INHIBITOR COUGH: ICD-10-CM

## 2023-06-29 DIAGNOSIS — Z02.5 ROUTINE SPORTS PHYSICAL EXAM: ICD-10-CM

## 2023-06-29 DIAGNOSIS — Z13.6 SCREENING FOR CARDIOVASCULAR CONDITION: ICD-10-CM

## 2023-06-29 DIAGNOSIS — Z00.00 ANNUAL PHYSICAL EXAM: Primary | ICD-10-CM

## 2023-06-29 PROCEDURE — 3008F BODY MASS INDEX DOCD: CPT | Mod: CPTII,S$GLB,, | Performed by: STUDENT IN AN ORGANIZED HEALTH CARE EDUCATION/TRAINING PROGRAM

## 2023-06-29 PROCEDURE — 3074F SYST BP LT 130 MM HG: CPT | Mod: CPTII,S$GLB,, | Performed by: STUDENT IN AN ORGANIZED HEALTH CARE EDUCATION/TRAINING PROGRAM

## 2023-06-29 PROCEDURE — 3008F PR BODY MASS INDEX (BMI) DOCUMENTED: ICD-10-PCS | Mod: CPTII,S$GLB,, | Performed by: STUDENT IN AN ORGANIZED HEALTH CARE EDUCATION/TRAINING PROGRAM

## 2023-06-29 PROCEDURE — 1159F PR MEDICATION LIST DOCUMENTED IN MEDICAL RECORD: ICD-10-PCS | Mod: CPTII,S$GLB,, | Performed by: STUDENT IN AN ORGANIZED HEALTH CARE EDUCATION/TRAINING PROGRAM

## 2023-06-29 PROCEDURE — 3074F PR MOST RECENT SYSTOLIC BLOOD PRESSURE < 130 MM HG: ICD-10-PCS | Mod: CPTII,S$GLB,, | Performed by: STUDENT IN AN ORGANIZED HEALTH CARE EDUCATION/TRAINING PROGRAM

## 2023-06-29 PROCEDURE — 3078F DIAST BP <80 MM HG: CPT | Mod: CPTII,S$GLB,, | Performed by: STUDENT IN AN ORGANIZED HEALTH CARE EDUCATION/TRAINING PROGRAM

## 2023-06-29 PROCEDURE — 1159F MED LIST DOCD IN RCRD: CPT | Mod: CPTII,S$GLB,, | Performed by: STUDENT IN AN ORGANIZED HEALTH CARE EDUCATION/TRAINING PROGRAM

## 2023-06-29 PROCEDURE — 3078F PR MOST RECENT DIASTOLIC BLOOD PRESSURE < 80 MM HG: ICD-10-PCS | Mod: CPTII,S$GLB,, | Performed by: STUDENT IN AN ORGANIZED HEALTH CARE EDUCATION/TRAINING PROGRAM

## 2023-06-29 PROCEDURE — 1160F RVW MEDS BY RX/DR IN RCRD: CPT | Mod: CPTII,S$GLB,, | Performed by: STUDENT IN AN ORGANIZED HEALTH CARE EDUCATION/TRAINING PROGRAM

## 2023-06-29 PROCEDURE — 1160F PR REVIEW ALL MEDS BY PRESCRIBER/CLIN PHARMACIST DOCUMENTED: ICD-10-PCS | Mod: CPTII,S$GLB,, | Performed by: STUDENT IN AN ORGANIZED HEALTH CARE EDUCATION/TRAINING PROGRAM

## 2023-06-29 PROCEDURE — 4010F PR ACE/ARB THEARPY RXD/TAKEN: ICD-10-PCS | Mod: CPTII,S$GLB,, | Performed by: STUDENT IN AN ORGANIZED HEALTH CARE EDUCATION/TRAINING PROGRAM

## 2023-06-29 PROCEDURE — 99204 PR OFFICE/OUTPT VISIT, NEW, LEVL IV, 45-59 MIN: ICD-10-PCS | Mod: S$GLB,,, | Performed by: STUDENT IN AN ORGANIZED HEALTH CARE EDUCATION/TRAINING PROGRAM

## 2023-06-29 PROCEDURE — 99204 OFFICE O/P NEW MOD 45 MIN: CPT | Mod: S$GLB,,, | Performed by: STUDENT IN AN ORGANIZED HEALTH CARE EDUCATION/TRAINING PROGRAM

## 2023-06-29 PROCEDURE — 4010F ACE/ARB THERAPY RXD/TAKEN: CPT | Mod: CPTII,S$GLB,, | Performed by: STUDENT IN AN ORGANIZED HEALTH CARE EDUCATION/TRAINING PROGRAM

## 2023-06-29 PROCEDURE — 99999 PR PBB SHADOW E&M-EST. PATIENT-LVL IV: ICD-10-PCS | Mod: PBBFAC,,, | Performed by: STUDENT IN AN ORGANIZED HEALTH CARE EDUCATION/TRAINING PROGRAM

## 2023-06-29 PROCEDURE — 99999 PR PBB SHADOW E&M-EST. PATIENT-LVL IV: CPT | Mod: PBBFAC,,, | Performed by: STUDENT IN AN ORGANIZED HEALTH CARE EDUCATION/TRAINING PROGRAM

## 2023-06-29 RX ORDER — LISINOPRIL 10 MG/1
10 TABLET ORAL DAILY
Qty: 90 TABLET | Refills: 3 | Status: SHIPPED | OUTPATIENT
Start: 2023-06-29 | End: 2024-06-28

## 2023-06-29 RX ORDER — FLUTICASONE PROPIONATE 50 MCG
SPRAY, SUSPENSION (ML) NASAL
COMMUNITY
Start: 2023-06-23 | End: 2024-02-05

## 2023-06-29 NOTE — PATIENT INSTRUCTIONS
Annual:   - doing well today.   - getting baseline labs.   - physical exam today was reassuring.     Hx of HTN:   - BP well controlled today on Lisinopril 10mg.   - has stopped smoking and seen better pressures since.   - would advise trial off med.    - take BP at home once or twice daily for next 3-5 days and if pressures remain under 130/85, then do not need to be on med.  If start to climb over 140/90 do need to restart.       Ace induced cough:   - patient seems to be having some Ace induced cough.  Or at least 30 irritation.  Takes lisinopril 10 mg daily.   - if persists will transition to losartan 25/50 mg daily, however patient may not need to be on medication also, therefore will not switch right to new med today.

## 2023-06-29 NOTE — PROGRESS NOTES
"Subjective:           Patient ID: Coy Emmanuel Jr. is a 33 y.o. male who presents today with a chief complaint of med refill.    Chief Complaint:   Medication Refill      History of Present Illness:    33 y.o. male who presents today with a chief complaint of med refill.  History of hypertension, takes lisinopril 10 mg daily.    Previous provider was family med doctor in [place].    BP was elevated in 5226-0056, but was smoking.    Stopped smoking for vaping then stopped that too 4 months ago.    BP has been more controlled since.     Review of Systems   Constitutional: Negative.  Negative for fatigue and fever.   HENT:  Positive for sore throat (slight throat tickle with Lisinopril).    Eyes: Negative.    Respiratory: Negative.  Negative for cough, shortness of breath and wheezing.    Cardiovascular: Negative.  Negative for chest pain, palpitations and leg swelling.   Gastrointestinal: Negative.  Negative for diarrhea, nausea and vomiting.   Endocrine: Negative.    Genitourinary: Negative.  Negative for difficulty urinating, frequency and urgency.   Musculoskeletal: Negative.    Skin: Negative.    Allergic/Immunologic: Negative for food allergies.   Neurological:  Negative for weakness and headaches.   Psychiatric/Behavioral: Negative.  Negative for sleep disturbance.          Objective:        Vitals:    06/29/23 1032   BP: 120/74   BP Location: Right arm   Patient Position: Sitting   BP Method: Medium (Manual)   Pulse: 88   Resp: 16   Temp: 98.1 °F (36.7 °C)   TempSrc: Temporal   SpO2: 96%   Weight: 91.5 kg (201 lb 11.5 oz)   Height: 5' 10" (1.778 m)       Body mass index is 28.94 kg/m².      Physical Exam  Constitutional:       Appearance: Normal appearance. He is not toxic-appearing.      Comments: As per BMI.   HENT:      Head: Normocephalic and atraumatic.      Right Ear: External ear normal.      Left Ear: External ear normal.      Nose: No congestion.      Mouth/Throat:      Mouth: Mucous membranes " are moist.      Pharynx: Oropharynx is clear.   Eyes:      Extraocular Movements: Extraocular movements intact.      Conjunctiva/sclera: Conjunctivae normal.   Cardiovascular:      Rate and Rhythm: Normal rate and regular rhythm.      Heart sounds: No murmur heard.  Pulmonary:      Effort: Pulmonary effort is normal. No respiratory distress.      Breath sounds: No wheezing.   Abdominal:      General: Bowel sounds are normal.      Palpations: Abdomen is soft.   Musculoskeletal:         General: No swelling.      Cervical back: Normal range of motion.      Right lower leg: No edema.      Left lower leg: No edema.   Skin:     General: Skin is warm.      Capillary Refill: Capillary refill takes less than 2 seconds.      Coloration: Skin is not jaundiced.   Neurological:      General: No focal deficit present.      Mental Status: He is alert and oriented to person, place, and time.      Motor: No weakness.   Psychiatric:         Mood and Affect: Mood normal.           Lab Results   Component Value Date     08/03/2021    K 4.0 08/03/2021     08/03/2021    CO2 31 08/03/2021    BUN 12 08/03/2021    CREATININE 1.13 08/03/2021     No results found for: HGBA1C  No results found for: BNP, BNPTRIAGEBLO    Lab Results   Component Value Date    WBC 6.1 08/03/2021    HGB 16.4 08/03/2021    HCT 47.1 08/03/2021     08/03/2021     Lab Results   Component Value Date    CHOL 205 (H) 08/03/2021    HDL 38 (L) 08/03/2021    LDLCALC 124 (H) 08/03/2021    TRIG 300 (H) 08/03/2021          Current Outpatient Medications:     fluticasone propionate (FLONASE) 50 mcg/actuation nasal spray, by Each Nostril route., Disp: , Rfl:     lisinopriL 10 MG tablet, Take 1 tablet (10 mg total) by mouth once daily., Disp: 90 tablet, Rfl: 3     Outpatient Encounter Medications as of 6/29/2023   Medication Sig Dispense Refill    fluticasone propionate (FLONASE) 50 mcg/actuation nasal spray by Each Nostril route.      [DISCONTINUED] lisinopriL  10 MG tablet Take 1 tablet (10 mg total) by mouth once daily. 90 tablet 3    lisinopriL 10 MG tablet Take 1 tablet (10 mg total) by mouth once daily. 90 tablet 3     No facility-administered encounter medications on file as of 6/29/2023.          Assessment:       1. Annual physical exam    2. Routine sports physical exam    3. Disorders of glucose transport, unspecified    4. ACE-inhibitor cough    5. Screening for cardiovascular condition           Plan:       Annual physical exam  -     CBC Auto Differential; Future; Expected date: 06/29/2023  -     Comprehensive Metabolic Panel; Future; Expected date: 06/29/2023  -     Lipid Panel; Future; Expected date: 06/29/2023  -     Hemoglobin A1C; Future; Expected date: 06/29/2023  -     TSH; Future; Expected date: 06/29/2023    Routine sports physical exam  Comments:  very healthy individual. Labs reviewed for ongoing pt care.   Orders:  -     lisinopriL 10 MG tablet; Take 1 tablet (10 mg total) by mouth once daily.  Dispense: 90 tablet; Refill: 3    Disorders of glucose transport, unspecified  -     Hemoglobin A1C; Future; Expected date: 06/29/2023    ACE-inhibitor cough    Screening for cardiovascular condition  -     CBC Auto Differential; Future; Expected date: 06/29/2023  -     Comprehensive Metabolic Panel; Future; Expected date: 06/29/2023  -     Lipid Panel; Future; Expected date: 06/29/2023             Annual:   - doing well today.   - getting baseline labs.   - physical exam today was reassuring.     Hx of HTN:   - BP well controlled today on Lisinopril 10mg.   - has stopped smoking and seen better pressures since.   - would advise trial off med.    - take BP at home once or twice daily for next 3-5 days and if pressures remain under 130/85, then do not need to be on med.  If start to climb over 140/90 do need to restart.       Ace induced cough:   - patient seems to be having some Ace induced cough.  Or at least 30 irritation.  Takes lisinopril 10 mg  daily.   - if persists will transition to losartan 25/50 mg daily, however patient may not need to be on medication also, therefore will not switch right to new med today.

## 2023-09-18 ENCOUNTER — PATIENT MESSAGE (OUTPATIENT)
Dept: PRIMARY CARE CLINIC | Facility: CLINIC | Age: 33
End: 2023-09-18
Payer: COMMERCIAL

## 2023-10-18 ENCOUNTER — PATIENT MESSAGE (OUTPATIENT)
Dept: CARDIOLOGY | Facility: CLINIC | Age: 33
End: 2023-10-18
Payer: COMMERCIAL

## 2024-02-05 ENCOUNTER — OFFICE VISIT (OUTPATIENT)
Dept: PRIMARY CARE CLINIC | Facility: CLINIC | Age: 34
End: 2024-02-05
Payer: COMMERCIAL

## 2024-02-05 VITALS
RESPIRATION RATE: 18 BRPM | SYSTOLIC BLOOD PRESSURE: 120 MMHG | WEIGHT: 207 LBS | HEIGHT: 70 IN | TEMPERATURE: 98 F | BODY MASS INDEX: 29.63 KG/M2 | DIASTOLIC BLOOD PRESSURE: 76 MMHG | HEART RATE: 70 BPM | OXYGEN SATURATION: 97 %

## 2024-02-05 DIAGNOSIS — R06.83 SNORING: ICD-10-CM

## 2024-02-05 DIAGNOSIS — R03.0 ELEVATED BLOOD PRESSURE READING: ICD-10-CM

## 2024-02-05 DIAGNOSIS — R53.83 FATIGUE, UNSPECIFIED TYPE: ICD-10-CM

## 2024-02-05 DIAGNOSIS — K52.9 CHRONIC DIARRHEA: ICD-10-CM

## 2024-02-05 DIAGNOSIS — Z00.00 ANNUAL PHYSICAL EXAM: Primary | ICD-10-CM

## 2024-02-05 PROCEDURE — 3008F BODY MASS INDEX DOCD: CPT | Mod: CPTII,S$GLB,, | Performed by: STUDENT IN AN ORGANIZED HEALTH CARE EDUCATION/TRAINING PROGRAM

## 2024-02-05 PROCEDURE — 99999 PR PBB SHADOW E&M-EST. PATIENT-LVL V: CPT | Mod: PBBFAC,,, | Performed by: STUDENT IN AN ORGANIZED HEALTH CARE EDUCATION/TRAINING PROGRAM

## 2024-02-05 PROCEDURE — 3074F SYST BP LT 130 MM HG: CPT | Mod: CPTII,S$GLB,, | Performed by: STUDENT IN AN ORGANIZED HEALTH CARE EDUCATION/TRAINING PROGRAM

## 2024-02-05 PROCEDURE — 3078F DIAST BP <80 MM HG: CPT | Mod: CPTII,S$GLB,, | Performed by: STUDENT IN AN ORGANIZED HEALTH CARE EDUCATION/TRAINING PROGRAM

## 2024-02-05 PROCEDURE — 99395 PREV VISIT EST AGE 18-39: CPT | Mod: S$GLB,,, | Performed by: STUDENT IN AN ORGANIZED HEALTH CARE EDUCATION/TRAINING PROGRAM

## 2024-02-05 PROCEDURE — 1159F MED LIST DOCD IN RCRD: CPT | Mod: CPTII,S$GLB,, | Performed by: STUDENT IN AN ORGANIZED HEALTH CARE EDUCATION/TRAINING PROGRAM

## 2024-02-05 PROCEDURE — 1160F RVW MEDS BY RX/DR IN RCRD: CPT | Mod: CPTII,S$GLB,, | Performed by: STUDENT IN AN ORGANIZED HEALTH CARE EDUCATION/TRAINING PROGRAM

## 2024-02-05 NOTE — PROGRESS NOTES
Subjective:           Patient ID: Coy Emmanuel Jr. is a 33 y.o. male who presents today with a chief complaint of Follow-up  .    Chief Complaint:   Follow-up      History of Present Illness:    Coy Emmanuel Jr. is a 33 y.o. male who presents today with a chief complaint of Follow-up  .      32yo male presenting for review labs and to discuss fatigue.  Has feel lower energy for last year.   Wakes a bit more tired.    Has been getting 6-8 hours of sleep per night. Wakes easily.  Get to sleep okay.  Some snoring.   No witnessed apnea.   Neck circumference 41cm.      BP at home was 150/90s.      Stomach issues. Has been having loose movements, multiple times daily.   Consistant.  Not painful. Gets fiber in diet.   Does coffee about 3x weekly.         2/5/2024     6:05 PM   EPWORTH SLEEPINESS SCALE   Sitting and reading 1   Watching TV 2   Sitting, inactive in a public place (e.g. a theatre or a meeting) 1   As a passenger in a car for an hour without a break 2   Lying down to rest in the afternoon when circumstances permit 2   Sitting and talking to someone 0   Sitting quietly after a lunch without alcohol 0   In a car, while stopped for a few minutes in traffic 0   Total score 8       Stop Bang Score 4, elevated; with neck circumference of 41 cm, male, snoring and hypertension being treated with medication.      Review of Systems   Constitutional:  Positive for fatigue. Negative for activity change and unexpected weight change.   HENT:  Negative for hearing loss, rhinorrhea and trouble swallowing.    Eyes:  Negative for discharge and visual disturbance.   Respiratory:  Negative for chest tightness and wheezing.    Cardiovascular:  Negative for chest pain and palpitations.   Gastrointestinal:  Positive for diarrhea. Negative for blood in stool, constipation and vomiting.   Endocrine: Negative for polydipsia and polyuria.   Genitourinary:  Negative for difficulty urinating, hematuria and urgency.  "  Musculoskeletal:  Positive for neck pain. Negative for arthralgias and joint swelling.   Neurological:  Negative for weakness and headaches.   Psychiatric/Behavioral:  Negative for confusion and dysphoric mood.            Objective:        Vitals:    02/05/24 0837   BP: 120/76   BP Location: Right arm   Patient Position: Sitting   BP Method: Medium (Manual)   Pulse: 70   Resp: 18   Temp: 97.5 °F (36.4 °C)   TempSrc: Temporal   SpO2: 97%   Weight: 93.9 kg (207 lb 0.2 oz)   Height: 5' 10" (1.778 m)       Body mass index is 29.7 kg/m².      Physical Exam  Constitutional:       Appearance: Normal appearance. He is not toxic-appearing.      Comments: As per BMI.   HENT:      Head: Normocephalic and atraumatic.      Right Ear: External ear normal.      Left Ear: External ear normal.      Nose: No congestion.      Mouth/Throat:      Mouth: Mucous membranes are moist.      Pharynx: Oropharynx is clear.   Eyes:      Extraocular Movements: Extraocular movements intact.      Conjunctiva/sclera: Conjunctivae normal.   Cardiovascular:      Rate and Rhythm: Normal rate and regular rhythm.      Heart sounds: No murmur heard.  Pulmonary:      Effort: Pulmonary effort is normal. No respiratory distress.      Breath sounds: No wheezing.   Abdominal:      General: Bowel sounds are normal.      Palpations: Abdomen is soft.   Musculoskeletal:         General: No swelling.      Cervical back: Normal range of motion.      Right lower leg: No edema.      Left lower leg: No edema.   Skin:     General: Skin is warm.      Capillary Refill: Capillary refill takes less than 2 seconds.      Coloration: Skin is not jaundiced.   Neurological:      General: No focal deficit present.      Mental Status: He is alert and oriented to person, place, and time.      Motor: No weakness.   Psychiatric:         Mood and Affect: Mood normal.             Lab Results   Component Value Date     11/13/2023    K 4.9 11/13/2023     11/13/2023    CO2 " "26 11/13/2023    BUN 15 11/13/2023    CREATININE 1.3 11/13/2023    ANIONGAP 7 (L) 11/13/2023     Lab Results   Component Value Date    HGBA1C 4.9 11/13/2023     No results found for: "BNP", "BNPTRIAGEBLO"    Lab Results   Component Value Date    WBC 4.27 11/13/2023    HGB 15.6 11/13/2023    HCT 45.7 11/13/2023     11/13/2023    GRAN 2.4 11/13/2023    GRAN 55.4 11/13/2023     Lab Results   Component Value Date    CHOL 228 (H) 11/13/2023    HDL 46 11/13/2023    LDLCALC 136.4 11/13/2023    TRIG 228 (H) 11/13/2023          Current Outpatient Medications:     lisinopriL 10 MG tablet, Take 1 tablet (10 mg total) by mouth once daily. (Patient not taking: Reported on 2/5/2024), Disp: 90 tablet, Rfl: 3     Outpatient Encounter Medications as of 2/5/2024   Medication Sig Dispense Refill    lisinopriL 10 MG tablet Take 1 tablet (10 mg total) by mouth once daily. (Patient not taking: Reported on 2/5/2024) 90 tablet 3    [DISCONTINUED] fluticasone propionate (FLONASE) 50 mcg/actuation nasal spray by Each Nostril route.       No facility-administered encounter medications on file as of 2/5/2024.          Assessment:       1. Annual physical exam    2. Snoring    3. Fatigue, unspecified type    4. Chronic diarrhea    5. Elevated blood pressure reading           Plan:       Annual physical exam  -     CBC Auto Differential; Future; Expected date: 02/05/2024  -     TSH; Future; Expected date: 02/05/2024  -     Comprehensive Metabolic Panel; Future; Expected date: 02/05/2024  -     Lipid Panel; Future; Expected date: 02/05/2024    Snoring  -     Ambulatory referral/consult to Sleep Disorders; Future; Expected date: 02/12/2024  -     CBC Auto Differential; Future; Expected date: 02/05/2024  -     TSH; Future; Expected date: 02/05/2024  -     Comprehensive Metabolic Panel; Future; Expected date: 02/05/2024  -     Lipid Panel; Future; Expected date: 02/05/2024    Fatigue, unspecified type  -     Ambulatory referral/consult to " Sleep Disorders; Future; Expected date: 02/12/2024  -     CBC Auto Differential; Future; Expected date: 02/05/2024  -     TSH; Future; Expected date: 02/05/2024  -     Comprehensive Metabolic Panel; Future; Expected date: 02/05/2024  -     Lipid Panel; Future; Expected date: 02/05/2024    Chronic diarrhea  -     CBC Auto Differential; Future; Expected date: 02/05/2024  -     TSH; Future; Expected date: 02/05/2024  -     Comprehensive Metabolic Panel; Future; Expected date: 02/05/2024  -     Lipid Panel; Future; Expected date: 02/05/2024    Elevated blood pressure reading  -     CBC Auto Differential; Future; Expected date: 02/05/2024  -     TSH; Future; Expected date: 02/05/2024  -     Comprehensive Metabolic Panel; Future; Expected date: 02/05/2024  -     Lipid Panel; Future; Expected date: 02/05/2024             Lab review:   - patient had mildly elevated TSH, on recheck was normalized.  Recommended we monitor, will recheck in 1 year.       Annual physical exam   - doing generally well today.   - vitals normal.    - exam reassuring.    Snoring  -     Ambulatory referral/consult to Sleep Disorders; Future; Expected date: 02/12/2024   - patient has some issue with snoring as well as daytime somnolence.   - stop Bang score of 4 with neck circumference of 41 cm, male, snoring and hypertension being treated with medication.   - elevated [place] sleep score, 8.      Fatigue, unspecified type  -     Ambulatory referral/consult to Sleep Disorders; Future; Expected date: 02/12/2024   - advised use of vitamin B supplementation, discuss getting daily sunlight, ideally early in the morning.  Recommended avoiding smoking.  Regular exercise.  Recommended getting up and doing exercise in the morning if possible, getting hydration with a water early in the morning, limiting coffee until 90 minutes after arising.  Considering cold showers in the morning as well.  All of these things have been shown to help elevate adrenal on,  norepinephrine and to help with wakefulness.   - some concern of possible sleep apnea due to loud snoring, large neck, would recommend getting sleep study completed.        2/5/2024     6:05 PM   EPWORTH SLEEPINESS SCALE   Sitting and reading 1   Watching TV 2   Sitting, inactive in a public place (e.g. a theatre or a meeting) 1   As a passenger in a car for an hour without a break 2   Lying down to rest in the afternoon when circumstances permit 2   Sitting and talking to someone 0   Sitting quietly after a lunch without alcohol 0   In a car, while stopped for a few minutes in traffic 0   Total score 8       Stop Bang Score 4, elevated; with neck circumference of 41 cm, male, snoring and hypertension being treated with medication.

## 2024-02-05 NOTE — PATIENT INSTRUCTIONS
Lab review:   - patient had mildly elevated TSH, on recheck was normalized.  Recommended we monitor, will recheck in 1 year.       Annual physical exam   - doing generally well today.   - vitals normal.    - exam reassuring.    Snoring  -     Ambulatory referral/consult to Sleep Disorders; Future; Expected date: 02/12/2024   - patient has some issue with snoring as well as daytime somnolence.   - stop Bang score of 4 with neck circumference of 41 cm, male, snoring and hypertension being treated with medication.   - elevated [place] sleep score, 8.    Fatigue, unspecified type  -     Ambulatory referral/consult to Sleep Disorders; Future; Expected date: 02/12/2024   - advised use of vitamin B supplementation, discuss getting daily sunlight, ideally early in the morning.  Recommended avoiding smoking.  Regular exercise.  Recommended getting up and doing exercise in the morning if possible, getting hydration with a water early in the morning, limiting coffee until 90 minutes after arising.  Considering cold showers in the morning as well.  All of these things have been shown to help elevate adrenal on, norepinephrine and to help with wakefulness.   - some concern of possible sleep apnea due to loud snoring, large neck, would recommend getting sleep study completed.

## 2024-09-19 ENCOUNTER — PATIENT MESSAGE (OUTPATIENT)
Dept: PRIMARY CARE CLINIC | Facility: CLINIC | Age: 34
End: 2024-09-19
Payer: COMMERCIAL

## 2025-02-21 ENCOUNTER — OFFICE VISIT (OUTPATIENT)
Dept: PRIMARY CARE CLINIC | Facility: CLINIC | Age: 35
End: 2025-02-21
Payer: COMMERCIAL

## 2025-02-21 VITALS
BODY MASS INDEX: 29.12 KG/M2 | HEART RATE: 79 BPM | WEIGHT: 203.38 LBS | SYSTOLIC BLOOD PRESSURE: 115 MMHG | OXYGEN SATURATION: 98 % | HEIGHT: 70 IN | DIASTOLIC BLOOD PRESSURE: 77 MMHG

## 2025-02-21 DIAGNOSIS — M62.838 NECK MUSCLE SPASM: ICD-10-CM

## 2025-02-21 DIAGNOSIS — J30.2 SEASONAL ALLERGIC RHINITIS, UNSPECIFIED TRIGGER: ICD-10-CM

## 2025-02-21 DIAGNOSIS — R53.83 FATIGUE, UNSPECIFIED TYPE: ICD-10-CM

## 2025-02-21 DIAGNOSIS — R51.9 GENERALIZED HEADACHES: Primary | ICD-10-CM

## 2025-02-21 DIAGNOSIS — R03.0 ELEVATED BLOOD PRESSURE READING: ICD-10-CM

## 2025-02-21 RX ORDER — PANTOPRAZOLE SODIUM 40 MG/1
1 TABLET, DELAYED RELEASE ORAL EVERY MORNING
COMMUNITY

## 2025-02-21 RX ORDER — LEVOCETIRIZINE DIHYDROCHLORIDE 5 MG/1
5 TABLET, FILM COATED ORAL NIGHTLY
Qty: 30 TABLET | Refills: 11 | Status: SHIPPED | OUTPATIENT
Start: 2025-02-21 | End: 2026-02-21

## 2025-02-21 RX ORDER — METHOCARBAMOL 750 MG/1
750 TABLET, FILM COATED ORAL 4 TIMES DAILY PRN
Qty: 40 TABLET | Refills: 0 | Status: SHIPPED | OUTPATIENT
Start: 2025-02-21 | End: 2025-03-03

## 2025-02-21 NOTE — PROGRESS NOTES
Clinic Note  2/21/2025      Subjective:       Patient ID:  Coy is a 34 y.o. male being seen for an established visit.    Chief Complaint: Follow-up    Patient presents to clinic for headache, fatigue and neck pain    Headaches - waking up with headaches, started 6 days ago , 6/10 pain this morning, denies headache currently, pain is located behind his eyes, reports relief with Excedrin migraine. Denies visual disturbance, dizziness, SOB, chest pain, or palpations.     Neck pain - began 6 days ago, denies trauma/  injury , 3/10 pain, reports waking up with pain in neck, denies fevers, denies loss of bowl or bladder function     Fatigue- not a new issue, reports getting 8 hrs of sleep at night but feels groggy throughout the day until 230-3pm     Flu - declined , patient accepts risks and benefits    Patient denies any concerns today      Review of Systems   Constitutional:  Positive for fatigue. Negative for appetite change, fever and unexpected weight change.   HENT:  Negative for hearing loss and sore throat.    Eyes:  Negative for pain and visual disturbance.   Respiratory:  Negative for cough, shortness of breath and wheezing.    Cardiovascular:  Negative for chest pain, palpitations and leg swelling.   Gastrointestinal:  Negative for abdominal pain, blood in stool, constipation, diarrhea, nausea and vomiting.   Genitourinary:  Negative for dysuria.   Musculoskeletal:  Positive for neck pain. Negative for arthralgias.   Neurological:  Positive for headaches. Negative for dizziness.   Psychiatric/Behavioral:  Negative for suicidal ideas.         Medication List with Changes/Refills   New Medications    LEVOCETIRIZINE (XYZAL) 5 MG TABLET    Take 1 tablet (5 mg total) by mouth every evening.    METHOCARBAMOL (ROBAXIN) 750 MG TAB    Take 1 tablet (750 mg total) by mouth 4 (four) times daily as needed (spasms).   Current Medications    PANTOPRAZOLE (PROTONIX) 40 MG TABLET    Take 1 tablet by mouth every morning.  "  Discontinued Medications    LISINOPRIL 10 MG TABLET    Take 1 tablet (10 mg total) by mouth once daily.       Problem List[1]        Objective:      /77 (BP Location: Left arm)   Pulse 79   Ht 5' 10" (1.778 m)   Wt 92.3 kg (203 lb 6 oz)   SpO2 98%   BMI 29.18 kg/m²   Estimated body mass index is 29.18 kg/m² as calculated from the following:    Height as of this encounter: 5' 10" (1.778 m).    Weight as of this encounter: 92.3 kg (203 lb 6 oz).  Physical Exam  Vitals and nursing note reviewed.   Constitutional:       General: He is not in acute distress.  HENT:      Head: Atraumatic.   Cardiovascular:      Rate and Rhythm: Normal rate and regular rhythm.      Heart sounds: No murmur heard.  Pulmonary:      Effort: Pulmonary effort is normal. No respiratory distress.      Breath sounds: Normal breath sounds. No wheezing, rhonchi or rales.   Musculoskeletal:         General: Normal range of motion.      Cervical back: Spasms and tenderness present. No erythema, signs of trauma or bony tenderness. Normal range of motion.      Thoracic back: Normal.      Lumbar back: Normal.      Right lower leg: No edema.      Left lower leg: No edema.   Skin:     Capillary Refill: Capillary refill takes less than 2 seconds.   Neurological:      Mental Status: He is alert and oriented to person, place, and time.      Cranial Nerves: Cranial nerves 2-12 are intact.      Sensory: Sensation is intact.      Motor: Motor function is intact.      Coordination: Romberg sign negative. Coordination normal. Finger-Nose-Finger Test and Heel to Shin Test normal. Rapid alternating movements normal.      Gait: Gait normal.   Psychiatric:         Mood and Affect: Mood normal.         Thought Content: Thought content normal.             Assessment and Plan:   Exercise and Diet   Continue Excedrin Migraine as needed   Neck pain- conservative measures at home , muscle relaxant   Multivitamin with supplements Vit D and B12   Labs today     "   Problem List Items Addressed This Visit    None  Visit Diagnoses         Generalized headaches    -  Primary      Fatigue, unspecified type        Relevant Orders    CBC Auto Differential    Comprehensive Metabolic Panel    Hemoglobin A1C    TSH    Lipid Panel      Elevated blood pressure reading        Relevant Orders    CBC Auto Differential    Comprehensive Metabolic Panel    Hemoglobin A1C    TSH    Lipid Panel      Neck muscle spasm        Relevant Medications    methocarbamoL (ROBAXIN) 750 MG Tab      Seasonal allergic rhinitis, unspecified trigger        Relevant Medications    levocetirizine (XYZAL) 5 MG tablet          Reviewed risks, benefits, and appropriate medication use prescribed  Discussed LS changes as appropriate   Reviewed cancer screening guidelines   Advised to keep speciality and follow up appointments as scheduled   Reviewed ER precautions   Verbalized understanding and is agreeable to plan      Follow Up:   Follow up in about 3 months (around 5/21/2025), or if symptoms worsen or fail to improve, for headaches .    Other Orders Placed This Visit:  Orders Placed This Encounter   Procedures    CBC Auto Differential    Comprehensive Metabolic Panel    Hemoglobin A1C    TSH    Lipid Panel           JOSE ROBERTO Hodge              [1] There is no problem list on file for this patient.

## 2025-02-24 ENCOUNTER — RESULTS FOLLOW-UP (OUTPATIENT)
Dept: PRIMARY CARE CLINIC | Facility: CLINIC | Age: 35
End: 2025-02-24
Payer: COMMERCIAL

## 2025-02-24 DIAGNOSIS — R74.8 ELEVATED LIVER ENZYMES: Primary | ICD-10-CM

## 2025-02-24 NOTE — TELEPHONE ENCOUNTER
Spoke with pt about getting non fasting lab work this week and to avoid alcohol and tylenol due to elevated liver enzymes per Brett Reece. Pt scheduled U/S 02/26/2025 at 2:30pm.

## 2025-02-24 NOTE — TELEPHONE ENCOUNTER
----- Message from JOSE ROBERTO Hodge sent at 2/24/2025  9:35 AM CST -----  Please call to have ultrasound scheduled and let him know about blood work to have done this week.It can be nonfasting. Remind patient to avoid alcohol and tylenol use with elevated liver enzymes.   Thank you   ----- Message -----  From: [facility] [employer]  Sent: 2/21/2025   3:44 PM CST  To: JOSE ROBERTO Ma

## 2025-02-25 ENCOUNTER — RESULTS FOLLOW-UP (OUTPATIENT)
Dept: PRIMARY CARE CLINIC | Facility: CLINIC | Age: 35
End: 2025-02-25

## 2025-02-26 ENCOUNTER — RESULTS FOLLOW-UP (OUTPATIENT)
Dept: PRIMARY CARE CLINIC | Facility: CLINIC | Age: 35
End: 2025-02-26
Payer: COMMERCIAL

## 2025-02-26 DIAGNOSIS — R16.1 SPLENOMEGALY: Primary | ICD-10-CM
